# Patient Record
Sex: FEMALE | Race: WHITE | NOT HISPANIC OR LATINO | Employment: OTHER | URBAN - METROPOLITAN AREA
[De-identification: names, ages, dates, MRNs, and addresses within clinical notes are randomized per-mention and may not be internally consistent; named-entity substitution may affect disease eponyms.]

---

## 2021-09-05 ENCOUNTER — APPOINTMENT (OUTPATIENT)
Dept: CT IMAGING | Facility: HOSPITAL | Age: 86
End: 2021-09-05

## 2021-09-05 ENCOUNTER — HOSPITAL ENCOUNTER (EMERGENCY)
Facility: HOSPITAL | Age: 86
Discharge: HOME | End: 2021-09-06
Attending: EMERGENCY MEDICINE | Admitting: EMERGENCY MEDICINE

## 2021-09-05 VITALS
DIASTOLIC BLOOD PRESSURE: 81 MMHG | SYSTOLIC BLOOD PRESSURE: 158 MMHG | WEIGHT: 135 LBS | TEMPERATURE: 98.5 F | HEART RATE: 85 BPM | RESPIRATION RATE: 18 BRPM | HEIGHT: 65 IN | BODY MASS INDEX: 22.49 KG/M2 | OXYGEN SATURATION: 98 %

## 2021-09-05 DIAGNOSIS — R10.31 BILATERAL LOWER ABDOMINAL CRAMPING: Primary | ICD-10-CM

## 2021-09-05 DIAGNOSIS — R79.89 ELEVATED LACTIC ACID LEVEL: ICD-10-CM

## 2021-09-05 DIAGNOSIS — R10.32 BILATERAL LOWER ABDOMINAL CRAMPING: Primary | ICD-10-CM

## 2021-09-05 DIAGNOSIS — R14.3 FLATUS: ICD-10-CM

## 2021-09-05 DIAGNOSIS — Z87.19 HISTORY OF IBS: ICD-10-CM

## 2021-09-05 DIAGNOSIS — T50.905A ADVERSE EFFECT OF DRUG, INITIAL ENCOUNTER: ICD-10-CM

## 2021-09-05 DIAGNOSIS — Z87.440 HISTORY OF UTI: ICD-10-CM

## 2021-09-05 DIAGNOSIS — R19.7 DIARRHEA, UNSPECIFIED TYPE: ICD-10-CM

## 2021-09-05 LAB
ALBUMIN SERPL-MCNC: 4.5 G/DL (ref 3.5–5.2)
ALBUMIN/GLOB SERPL: 1.4 G/DL
ALP SERPL-CCNC: 123 U/L (ref 39–117)
ALT SERPL W P-5'-P-CCNC: 9 U/L (ref 1–33)
ANION GAP SERPL CALCULATED.3IONS-SCNC: 14 MMOL/L (ref 5–15)
AST SERPL-CCNC: 19 U/L (ref 1–32)
BASOPHILS # BLD AUTO: 0.04 10*3/MM3 (ref 0–0.2)
BASOPHILS NFR BLD AUTO: 0.6 % (ref 0–1.5)
BILIRUB SERPL-MCNC: 0.2 MG/DL (ref 0–1.2)
BUN SERPL-MCNC: 12 MG/DL (ref 8–23)
BUN/CREAT SERPL: 9.9 (ref 7–25)
CALCIUM SPEC-SCNC: 9.5 MG/DL (ref 8.6–10.5)
CHLORIDE SERPL-SCNC: 101 MMOL/L (ref 98–107)
CO2 SERPL-SCNC: 24 MMOL/L (ref 22–29)
CREAT SERPL-MCNC: 1.21 MG/DL (ref 0.57–1)
CRP SERPL-MCNC: <0.3 MG/DL (ref 0–0.5)
D-LACTATE SERPL-SCNC: 3 MMOL/L (ref 0.5–2)
DEPRECATED RDW RBC AUTO: 41.6 FL (ref 37–54)
EOSINOPHIL # BLD AUTO: 0.47 10*3/MM3 (ref 0–0.4)
EOSINOPHIL NFR BLD AUTO: 7.5 % (ref 0.3–6.2)
ERYTHROCYTE [DISTWIDTH] IN BLOOD BY AUTOMATED COUNT: 12.5 % (ref 12.3–15.4)
FLUAV RNA RESP QL NAA+PROBE: NOT DETECTED
FLUBV RNA RESP QL NAA+PROBE: NOT DETECTED
GFR SERPL CREATININE-BSD FRML MDRD: 42 ML/MIN/1.73
GFR SERPL CREATININE-BSD FRML MDRD: 51 ML/MIN/1.73
GLOBULIN UR ELPH-MCNC: 3.2 GM/DL
GLUCOSE SERPL-MCNC: 128 MG/DL (ref 65–99)
HCT VFR BLD AUTO: 37.5 % (ref 34–46.6)
HGB BLD-MCNC: 12.5 G/DL (ref 12–15.9)
HOLD SPECIMEN: NORMAL
HOLD SPECIMEN: NORMAL
IMM GRANULOCYTES # BLD AUTO: 0.01 10*3/MM3 (ref 0–0.05)
IMM GRANULOCYTES NFR BLD AUTO: 0.2 % (ref 0–0.5)
LDH SERPL-CCNC: 207 U/L (ref 135–214)
LIPASE SERPL-CCNC: 58 U/L (ref 13–60)
LYMPHOCYTES # BLD AUTO: 2.44 10*3/MM3 (ref 0.7–3.1)
LYMPHOCYTES NFR BLD AUTO: 38.9 % (ref 19.6–45.3)
MCH RBC QN AUTO: 30.5 PG (ref 26.6–33)
MCHC RBC AUTO-ENTMCNC: 33.3 G/DL (ref 31.5–35.7)
MCV RBC AUTO: 91.5 FL (ref 79–97)
MONOCYTES # BLD AUTO: 0.56 10*3/MM3 (ref 0.1–0.9)
MONOCYTES NFR BLD AUTO: 8.9 % (ref 5–12)
NEUTROPHILS NFR BLD AUTO: 2.76 10*3/MM3 (ref 1.7–7)
NEUTROPHILS NFR BLD AUTO: 43.9 % (ref 42.7–76)
NRBC BLD AUTO-RTO: 0 /100 WBC (ref 0–0.2)
PLATELET # BLD AUTO: 244 10*3/MM3 (ref 140–450)
PMV BLD AUTO: 9.2 FL (ref 6–12)
POTASSIUM SERPL-SCNC: 3.9 MMOL/L (ref 3.5–5.2)
PROCALCITONIN SERPL-MCNC: 0.13 NG/ML (ref 0–0.25)
PROT SERPL-MCNC: 7.7 G/DL (ref 6–8.5)
RBC # BLD AUTO: 4.1 10*6/MM3 (ref 3.77–5.28)
SARS-COV-2 RNA RESP QL NAA+PROBE: NOT DETECTED
SODIUM SERPL-SCNC: 139 MMOL/L (ref 136–145)
WBC # BLD AUTO: 6.28 10*3/MM3 (ref 3.4–10.8)
WHOLE BLOOD HOLD SPECIMEN: NORMAL
WHOLE BLOOD HOLD SPECIMEN: NORMAL

## 2021-09-05 PROCEDURE — 93005 ELECTROCARDIOGRAM TRACING: CPT | Performed by: EMERGENCY MEDICINE

## 2021-09-05 PROCEDURE — 80053 COMPREHEN METABOLIC PANEL: CPT | Performed by: EMERGENCY MEDICINE

## 2021-09-05 PROCEDURE — 99284 EMERGENCY DEPT VISIT MOD MDM: CPT

## 2021-09-05 PROCEDURE — 99283 EMERGENCY DEPT VISIT LOW MDM: CPT

## 2021-09-05 PROCEDURE — 84145 PROCALCITONIN (PCT): CPT | Performed by: PHYSICIAN ASSISTANT

## 2021-09-05 PROCEDURE — 86140 C-REACTIVE PROTEIN: CPT | Performed by: PHYSICIAN ASSISTANT

## 2021-09-05 PROCEDURE — 87636 SARSCOV2 & INF A&B AMP PRB: CPT | Performed by: PHYSICIAN ASSISTANT

## 2021-09-05 PROCEDURE — 83690 ASSAY OF LIPASE: CPT | Performed by: EMERGENCY MEDICINE

## 2021-09-05 PROCEDURE — 85025 COMPLETE CBC W/AUTO DIFF WBC: CPT | Performed by: EMERGENCY MEDICINE

## 2021-09-05 PROCEDURE — 83605 ASSAY OF LACTIC ACID: CPT | Performed by: EMERGENCY MEDICINE

## 2021-09-05 PROCEDURE — 83615 LACTATE (LD) (LDH) ENZYME: CPT | Performed by: PHYSICIAN ASSISTANT

## 2021-09-05 PROCEDURE — 74176 CT ABD & PELVIS W/O CONTRAST: CPT

## 2021-09-05 RX ORDER — DICYCLOMINE HCL 20 MG
20 TABLET ORAL EVERY 6 HOURS
COMMUNITY
End: 2021-09-06 | Stop reason: SDUPTHER

## 2021-09-05 RX ORDER — SODIUM CHLORIDE 9 MG/ML
10 INJECTION INTRAVENOUS AS NEEDED
Status: DISCONTINUED | OUTPATIENT
Start: 2021-09-05 | End: 2021-09-06 | Stop reason: HOSPADM

## 2021-09-05 RX ORDER — TAMARIND SEED/TURMERIC EXTRACT 250 MG
TABLET ORAL
COMMUNITY

## 2021-09-05 RX ORDER — OMEPRAZOLE 40 MG/1
40 CAPSULE, DELAYED RELEASE ORAL DAILY
COMMUNITY

## 2021-09-05 RX ADMIN — SODIUM CHLORIDE 1000 ML: 9 INJECTION, SOLUTION INTRAVENOUS at 22:54

## 2021-09-06 LAB
BACTERIA UR QL AUTO: ABNORMAL /HPF
BILIRUB UR QL STRIP: NEGATIVE
CLARITY UR: CLEAR
COLOR UR: YELLOW
D-LACTATE SERPL-SCNC: 2.2 MMOL/L (ref 0.5–2)
GLUCOSE UR STRIP-MCNC: NEGATIVE MG/DL
HGB UR QL STRIP.AUTO: NEGATIVE
HOLD SPECIMEN: NORMAL
HYALINE CASTS UR QL AUTO: ABNORMAL /LPF
KETONES UR QL STRIP: NEGATIVE
LEUKOCYTE ESTERASE UR QL STRIP.AUTO: ABNORMAL
NITRITE UR QL STRIP: NEGATIVE
PH UR STRIP.AUTO: 7.5 [PH] (ref 5–8)
PROT UR QL STRIP: NEGATIVE
RBC # UR: ABNORMAL /HPF
REF LAB TEST METHOD: ABNORMAL
SP GR UR STRIP: 1.01 (ref 1–1.03)
SQUAMOUS #/AREA URNS HPF: ABNORMAL /HPF
UROBILINOGEN UR QL STRIP: ABNORMAL
WBC UR QL AUTO: ABNORMAL /HPF

## 2021-09-06 PROCEDURE — 83605 ASSAY OF LACTIC ACID: CPT | Performed by: EMERGENCY MEDICINE

## 2021-09-06 PROCEDURE — 81001 URINALYSIS AUTO W/SCOPE: CPT | Performed by: EMERGENCY MEDICINE

## 2021-09-06 RX ORDER — DICYCLOMINE HCL 20 MG
20 TABLET ORAL EVERY 6 HOURS PRN
Qty: 20 TABLET | Refills: 0 | Status: SHIPPED | OUTPATIENT
Start: 2021-09-06

## 2021-09-06 NOTE — DISCHARGE INSTRUCTIONS
ER evaluation reveals essentially normal CBC and chemistries.  Lactic acid was elevated at 3.0 but improved with IV fluids to 2.2.  CT of the abdomen/pelvis without contrast revealed no acute infectious or inflammatory process.  Urinalysis showed no evidence of urinary tract infection.  Suspect that abdominal cramping, gas, and diarrhea are related to recent use of Bactrim DS.  Patient needs to discontinue antibiotic use at this time.  Increase water intake and encourage fluids.  Avoid greasy, spicy, or fatty foods.  Patient may utilize Activia yogurt and Kefir, probiotic, found in the dairy section of the grocery as a probiotic due to recent antibiotic use.  Patient may also take over-the-counter Gas-X to help with increased gas and bloating.  I gave her a refill on her Bentyl 20 mg by mouth every 6 hours as needed for cramping.  Recommend close PCP follow-up for recheck or return to the ER if worsening symptoms.

## 2021-09-06 NOTE — ED PROVIDER NOTES
Subjective   This is an 85-year-old female who presents to the ER with lower abdominal pain that started approximately 2 hours ago.  Patient describes pain as cramping with increased pressure.  She tried Mylanta without much improvement.  She is having increased gas.  She reported in the triage note that she was having some loose stools, but she told me that she had 2 soft normal bowel movements earlier this morning.  She denied any hematochezia, melena, or mucus in the stools.  She denies any nausea/vomiting.  She denies any sick contacts or suspicious food intake.  Patient says that she was diagnosed with a urinary tract infection 1 week ago and was prescribed Bactrim.  She denies any continued symptoms of dysuria, urgency, or frequency.  She has 5 more days of antibiotics left.  She denies any flank or back pain.  Last colonoscopy was approximately 1 year ago.  Patient has history of diverticulosis and has had diverticulitis before.  She denies any dysuria, urgency, or frequency.  She denies any fever but does have chills.  She denies any cough, congestion, loss of taste or smell, or chest pain/shortness of breath.  Patient reports having chicken tenders and fries this evening for supper.      History provided by:  Patient  Abdominal Pain  Pain location:  LLQ and RLQ  Pain quality: cramping and pressure    Pain radiates to:  Does not radiate  Onset quality:  Sudden  Duration:  2 hours  Timing:  Constant  Chronicity:  New  Context: previous surgery (Hysterectomy)    Context: not sick contacts and not suspicious food intake    Context comment:  Patient reports bilateral lower quadrant pain described as cramping and pressure that started 2 hours ago.  She reports 2 soft bowel movements earlier today.  Diagnosed with recent UTI approximately 1 week ago and has been taking Bactrim.  Positive chills.  Relieved by:  Nothing  Worsened by:  Nothing  Ineffective treatments:  OTC medications (Recent Rx for Bactrim for UTI  and Mylanta for abdominal cramping this evening.)  Associated symptoms: chills, diarrhea (To soft stools earlier today.  No hematochezia or melena.) and flatus    Associated symptoms: no anorexia, no belching, no chest pain, no constipation, no cough, no dysuria, no fatigue, no fever, no hematochezia, no melena, no nausea and no vomiting    Risk factors: no alcohol abuse, has not had multiple surgeries and no NSAID use        Review of Systems   Constitutional: Positive for chills. Negative for activity change, appetite change, fatigue and fever.   HENT: Negative.    Respiratory: Negative.  Negative for cough and chest tightness.    Cardiovascular: Negative.  Negative for chest pain, palpitations and leg swelling.   Gastrointestinal: Positive for abdominal pain (Bilateral lower quadrant abdominal pain), diarrhea (To soft stools earlier today.  No hematochezia or melena.) and flatus. Negative for anorexia, blood in stool, constipation, hematochezia, melena, nausea and vomiting.        Increased flatus and gas.   Genitourinary: Negative for dysuria, flank pain, frequency and urgency.        Recent UTI 1 week ago.  Patient prescribed Bactrim DS.   Musculoskeletal: Negative.  Negative for back pain.   Neurological: Positive for weakness (Generally weak). Negative for dizziness, light-headedness and headaches.   All other systems reviewed and are negative.      Past Medical History:   Diagnosis Date   • Irritable bowel syndrome    • UTI (urinary tract infection)        No Known Allergies    Past Surgical History:   Procedure Laterality Date   • HYSTERECTOMY         History reviewed. No pertinent family history.    Social History     Socioeconomic History   • Marital status:      Spouse name: Not on file   • Number of children: Not on file   • Years of education: Not on file   • Highest education level: Not on file   Tobacco Use   • Smoking status: Never Smoker   • Smokeless tobacco: Never Used   Vaping Use   •  Vaping Use: Never used   Substance and Sexual Activity   • Alcohol use: Never   • Drug use: Never   • Sexual activity: Defer           Objective   Physical Exam  Vitals and nursing note reviewed.   Constitutional:       Appearance: Normal appearance.      Comments: Patient is chilling with shaking of the upper and lower extremities.  She is afebrile.   HENT:      Head: Normocephalic and atraumatic.      Right Ear: Tympanic membrane normal.      Left Ear: Tympanic membrane normal.      Nose: Nose normal.      Mouth/Throat:      Mouth: Mucous membranes are moist.      Pharynx: Oropharynx is clear.   Eyes:      Extraocular Movements: Extraocular movements intact.      Conjunctiva/sclera: Conjunctivae normal.      Pupils: Pupils are equal, round, and reactive to light.   Cardiovascular:      Rate and Rhythm: Normal rate and regular rhythm.  No extrasystoles are present.     Pulses: Normal pulses.      Heart sounds: Normal heart sounds.      Comments: Regular rate and rhythm.  No ectopy.  No pedal edema to lower extremities.  Pulmonary:      Effort: Pulmonary effort is normal.      Breath sounds: Normal breath sounds.      Comments: Lungs are clear to auscultation bilaterally.  Abdominal:      General: Bowel sounds are normal. There is no distension.      Palpations: Abdomen is soft.      Tenderness: There is abdominal tenderness in the right lower quadrant and left lower quadrant. There is no right CVA tenderness, left CVA tenderness, guarding or rebound.      Comments: Abdomen soft without distention.  Active bowel sounds in all 4 quadrants.  Moderate tenderness to right lower quadrant and left lower quadrant.  No flank or CVA tenderness.  Abdominal exam is nonsurgical.   Musculoskeletal:         General: Normal range of motion.      Cervical back: Normal range of motion and neck supple.      Right lower leg: No edema.      Left lower leg: No edema.   Skin:     General: Skin is warm and dry.   Neurological:       General: No focal deficit present.      Mental Status: She is alert.      Cranial Nerves: Cranial nerves are intact.      Sensory: Sensation is intact.      Motor: Motor function is intact.      Coordination: Coordination is intact.      Comments: Generalized weakness with chills.  No focal deficits.         Procedures           ED Course  ED Course as of Sep 06 0314   Sun Sep 05, 2021   7429 CBC reveals normal white blood cell count at 6.28 with normal differential.  Chemistries show creatinine 1.21.  And otherwise were essentially normal.  Lactic acid is 3.0.  Lipase is 58.  LDH is 207.  Awaiting urinalysis results.  Covid testing is in process.  CT of the abdomen/pelvis without contrast reveals no acute intra-abdominal or intrapelvic pathology.  Patient does have evidence of diverticulosis to the sigmoid colon but no evidence of diverticulitis.  Patient receiving fluid bolus at present.  We will continue to follow closely.    [FC]   Mon Sep 06, 2021   0140 Upon reevaluation, patient is feeling much better.  She still has increased flatus but says that pain is subsiding after IV fluid bolus.  We will repeat lactic acid.  If it is trending downward, we will more than likely discharge patient to home with Zofran and Bentyl as prescribed for cramping.  Patient is agreeable with that plan.    [FC]   0307 Upon second reevaluation, lactic acid has improved to 2.2.  Patient feeling much better.  She still continues to have some increased flatus.  I recommended that takes Gas-X once she arrives home.  Patient says that she has antigas medicine at home.  I also will send in a prescription for Bentyl 20 mg by mouth every 6 hours as needed for cramping.  I advised patient to discontinue use of Bactrim DS.  I feel that it could be contributing to some of her abdominal discomfort and urinalysis during the ER work-up is improved.  Patient is agreeable.  Patient also needs to avoid greasy, spicy, or fatty foods.  Recommend close  PCP follow-up for recheck on Tuesday or return to the ER if any worsening symptoms sooner.    [FC]      ED Course User Index  [FC] Marybel Sellers PA-C           Recent Results (from the past 24 hour(s))   Comprehensive Metabolic Panel    Collection Time: 09/05/21 10:11 PM    Specimen: Blood   Result Value Ref Range    Glucose 128 (H) 65 - 99 mg/dL    BUN 12 8 - 23 mg/dL    Creatinine 1.21 (H) 0.57 - 1.00 mg/dL    Sodium 139 136 - 145 mmol/L    Potassium 3.9 3.5 - 5.2 mmol/L    Chloride 101 98 - 107 mmol/L    CO2 24.0 22.0 - 29.0 mmol/L    Calcium 9.5 8.6 - 10.5 mg/dL    Total Protein 7.7 6.0 - 8.5 g/dL    Albumin 4.50 3.50 - 5.20 g/dL    ALT (SGPT) 9 1 - 33 U/L    AST (SGOT) 19 1 - 32 U/L    Alkaline Phosphatase 123 (H) 39 - 117 U/L    Total Bilirubin 0.2 0.0 - 1.2 mg/dL    eGFR Non African Amer 42 (L) >60 mL/min/1.73    eGFR  African Amer 51 (L) >60 mL/min/1.73    Globulin 3.2 gm/dL    A/G Ratio 1.4 g/dL    BUN/Creatinine Ratio 9.9 7.0 - 25.0    Anion Gap 14.0 5.0 - 15.0 mmol/L   Lipase    Collection Time: 09/05/21 10:11 PM    Specimen: Blood   Result Value Ref Range    Lipase 58 13 - 60 U/L   Lactic Acid, Plasma    Collection Time: 09/05/21 10:11 PM    Specimen: Blood   Result Value Ref Range    Lactate 3.0 (C) 0.5 - 2.0 mmol/L   Green Top (Gel)    Collection Time: 09/05/21 10:11 PM   Result Value Ref Range    Extra Tube Hold for add-ons.    Lavender Top    Collection Time: 09/05/21 10:11 PM   Result Value Ref Range    Extra Tube hold for add-on    Gold Top - SST    Collection Time: 09/05/21 10:11 PM   Result Value Ref Range    Extra Tube Hold for add-ons.    Gray Top    Collection Time: 09/05/21 10:11 PM   Result Value Ref Range    Extra Tube Hold for add-ons.    Light Blue Top    Collection Time: 09/05/21 10:11 PM   Result Value Ref Range    Extra Tube hold for add-on    CBC Auto Differential    Collection Time: 09/05/21 10:11 PM    Specimen: Blood   Result Value Ref Range    WBC 6.28 3.40 - 10.80 10*3/mm3     RBC 4.10 3.77 - 5.28 10*6/mm3    Hemoglobin 12.5 12.0 - 15.9 g/dL    Hematocrit 37.5 34.0 - 46.6 %    MCV 91.5 79.0 - 97.0 fL    MCH 30.5 26.6 - 33.0 pg    MCHC 33.3 31.5 - 35.7 g/dL    RDW 12.5 12.3 - 15.4 %    RDW-SD 41.6 37.0 - 54.0 fl    MPV 9.2 6.0 - 12.0 fL    Platelets 244 140 - 450 10*3/mm3    Neutrophil % 43.9 42.7 - 76.0 %    Lymphocyte % 38.9 19.6 - 45.3 %    Monocyte % 8.9 5.0 - 12.0 %    Eosinophil % 7.5 (H) 0.3 - 6.2 %    Basophil % 0.6 0.0 - 1.5 %    Immature Grans % 0.2 0.0 - 0.5 %    Neutrophils, Absolute 2.76 1.70 - 7.00 10*3/mm3    Lymphocytes, Absolute 2.44 0.70 - 3.10 10*3/mm3    Monocytes, Absolute 0.56 0.10 - 0.90 10*3/mm3    Eosinophils, Absolute 0.47 (H) 0.00 - 0.40 10*3/mm3    Basophils, Absolute 0.04 0.00 - 0.20 10*3/mm3    Immature Grans, Absolute 0.01 0.00 - 0.05 10*3/mm3    nRBC 0.0 0.0 - 0.2 /100 WBC   Procalcitonin    Collection Time: 09/05/21 10:11 PM    Specimen: Blood   Result Value Ref Range    Procalcitonin 0.13 0.00 - 0.25 ng/mL   C-reactive Protein    Collection Time: 09/05/21 10:11 PM    Specimen: Blood   Result Value Ref Range    C-Reactive Protein <0.30 0.00 - 0.50 mg/dL   Lactate Dehydrogenase    Collection Time: 09/05/21 10:11 PM    Specimen: Blood   Result Value Ref Range     135 - 214 U/L   COVID-19 and FLU A/B PCR - Swab, Nasopharynx    Collection Time: 09/05/21 10:54 PM    Specimen: Nasopharynx; Swab   Result Value Ref Range    COVID19 Not Detected Not Detected - Ref. Range    Influenza A PCR Not Detected Not Detected    Influenza B PCR Not Detected Not Detected   Urinalysis With Microscopic If Indicated (No Culture) - Urine, Clean Catch    Collection Time: 09/06/21 12:51 AM    Specimen: Urine, Clean Catch   Result Value Ref Range    Color, UA Yellow Yellow, Straw    Appearance, UA Clear Clear    pH, UA 7.5 5.0 - 8.0    Specific Gravity, UA 1.012 1.001 - 1.030    Glucose, UA Negative Negative    Ketones, UA Negative Negative    Bilirubin, UA Negative Negative     "Blood, UA Negative Negative    Protein, UA Negative Negative    Leuk Esterase, UA Small (1+) (A) Negative    Nitrite, UA Negative Negative    Urobilinogen, UA 0.2 E.U./dL 0.2 - 1.0 E.U./dL   Urinalysis, Microscopic Only - Urine, Clean Catch    Collection Time: 09/06/21 12:51 AM    Specimen: Urine, Clean Catch   Result Value Ref Range    RBC, UA None Seen None Seen, 0-2 /HPF    WBC, UA 3-5 (A) None Seen, 0-2 /HPF    Bacteria, UA None Seen None Seen, Trace /HPF    Squamous Epithelial Cells, UA 0-2 None Seen, 0-2 /HPF    Hyaline Casts, UA 0-6 0 - 6 /LPF    Methodology Automated Microscopy    STAT Lactic Acid, Reflex    Collection Time: 09/06/21  1:43 AM    Specimen: Blood   Result Value Ref Range    Lactate 2.2 (C) 0.5 - 2.0 mmol/L     Note: In addition to lab results from this visit, the labs listed above may include labs taken at another facility or during a different encounter within the last 24 hours. Please correlate lab times with ED admission and discharge times for further clarification of the services performed during this visit.    CT Abdomen Pelvis Without Contrast   Final Result   No definite acute intra-abdominal abnormality. There is diverticulosis without definite evidence of diverticulitis.               Signer Name: Dionna Otoole MD    Signed: 9/5/2021 10:42 PM    Workstation Name: [id number]     [facility]        Vitals:    09/05/21 2203 09/05/21 2205 09/05/21 2239   BP: 161/76  158/81   BP Location: Right arm     Patient Position: Sitting     Pulse: 85     Resp: 18     Temp:  98.5 °F (36.9 °C)    TempSrc:  Oral    SpO2: 98%     Weight: 61.2 kg (135 lb)     Height: 165.1 cm (65\")       Medications   Sodium Chloride (PF) 0.9 % 10 mL (has no administration in time range)   sodium chloride 0.9 % bolus 1,000 mL (0 mL Intravenous Stopped 9/6/21 0052)     ECG/EMG Results (last 24 hours)     ** No results found for the last 24 hours. **        ECG 12 Lead    (Results Pending)          "                              MDM    Final diagnoses:   Bilateral lower abdominal cramping   Flatus   Diarrhea, unspecified type   History of UTI   Elevated lactic acid level   Adverse effect of drug, initial encounter   History of IBS       ED Disposition  ED Disposition     ED Disposition Condition Comment    Discharge Stable           Davie Chase MD  [address] [id number] [phone]    Schedule an appointment as soon as possible for a visit in 2 days  Close PCP follow-up for recheck    [facility] Emergency Department  [address] [facility] [address] [phone]    If symptoms worsen         Medication List      Changed    dicyclomine 20 MG tablet  Commonly known as: BENTYL  Take 1 tablet by mouth Every 6 (Six) Hours As Needed (abdominal cramping).  What changed:   · when to take this  · reasons to take this           Where to Get Your Medications      These medications were sent to [address] - [address] [facility] - [phone]  - [phone]   [address] [id number]    Phone: [phone]   · dicyclomine 20 MG tablet          Marybel Sellers PA-C  09/06/21 0314

## 2024-04-28 ENCOUNTER — APPOINTMENT (OUTPATIENT)
Dept: GENERAL RADIOLOGY | Facility: HOSPITAL | Age: 89
End: 2024-04-28
Payer: MEDICARE

## 2024-04-28 ENCOUNTER — HOSPITAL ENCOUNTER (EMERGENCY)
Facility: HOSPITAL | Age: 89
Discharge: HOME | End: 2024-04-28
Attending: EMERGENCY MEDICINE | Admitting: EMERGENCY MEDICINE
Payer: MEDICARE

## 2024-04-28 VITALS
OXYGEN SATURATION: 99 % | DIASTOLIC BLOOD PRESSURE: 65 MMHG | SYSTOLIC BLOOD PRESSURE: 150 MMHG | RESPIRATION RATE: 20 BRPM | HEART RATE: 62 BPM | WEIGHT: 140 LBS | HEIGHT: 63 IN | BODY MASS INDEX: 24.8 KG/M2 | TEMPERATURE: 97.9 F

## 2024-04-28 DIAGNOSIS — R06.02 SHORTNESS OF BREATH: Primary | ICD-10-CM

## 2024-04-28 LAB
ALBUMIN SERPL-MCNC: 3.6 G/DL (ref 3.5–5.2)
ALBUMIN/GLOB SERPL: 1 G/DL
ALP SERPL-CCNC: 101 U/L (ref 39–117)
ALT SERPL W P-5'-P-CCNC: 7 U/L (ref 1–33)
ANION GAP SERPL CALCULATED.3IONS-SCNC: 13 MMOL/L (ref 5–15)
AST SERPL-CCNC: 15 U/L (ref 1–32)
BASOPHILS # BLD AUTO: 0.04 10*3/MM3 (ref 0–0.2)
BASOPHILS NFR BLD AUTO: 0.5 % (ref 0–1.5)
BILIRUB SERPL-MCNC: 0.3 MG/DL (ref 0–1.2)
BUN SERPL-MCNC: 14 MG/DL (ref 8–23)
BUN/CREAT SERPL: 16.3 (ref 7–25)
CALCIUM SPEC-SCNC: 8.9 MG/DL (ref 8.6–10.5)
CHLORIDE SERPL-SCNC: 97 MMOL/L (ref 98–107)
CO2 SERPL-SCNC: 21 MMOL/L (ref 22–29)
CREAT SERPL-MCNC: 0.86 MG/DL (ref 0.57–1)
D DIMER PPP FEU-MCNC: 0.75 MCGFEU/ML (ref 0–0.87)
DEPRECATED RDW RBC AUTO: 43.4 FL (ref 37–54)
EGFRCR SERPLBLD CKD-EPI 2021: 65.5 ML/MIN/1.73
EOSINOPHIL # BLD AUTO: 0.43 10*3/MM3 (ref 0–0.4)
EOSINOPHIL NFR BLD AUTO: 5.2 % (ref 0.3–6.2)
ERYTHROCYTE [DISTWIDTH] IN BLOOD BY AUTOMATED COUNT: 13.6 % (ref 12.3–15.4)
GLOBULIN UR ELPH-MCNC: 3.6 GM/DL
GLUCOSE SERPL-MCNC: 93 MG/DL (ref 65–99)
HCT VFR BLD AUTO: 37.5 % (ref 34–46.6)
HGB BLD-MCNC: 12 G/DL (ref 12–15.9)
HOLD SPECIMEN: NORMAL
IMM GRANULOCYTES # BLD AUTO: 0.03 10*3/MM3 (ref 0–0.05)
IMM GRANULOCYTES NFR BLD AUTO: 0.4 % (ref 0–0.5)
LYMPHOCYTES # BLD AUTO: 1.63 10*3/MM3 (ref 0.7–3.1)
LYMPHOCYTES NFR BLD AUTO: 19.8 % (ref 19.6–45.3)
MCH RBC QN AUTO: 28.3 PG (ref 26.6–33)
MCHC RBC AUTO-ENTMCNC: 32 G/DL (ref 31.5–35.7)
MCV RBC AUTO: 88.4 FL (ref 79–97)
MONOCYTES # BLD AUTO: 0.67 10*3/MM3 (ref 0.1–0.9)
MONOCYTES NFR BLD AUTO: 8.1 % (ref 5–12)
NEUTROPHILS NFR BLD AUTO: 5.45 10*3/MM3 (ref 1.7–7)
NEUTROPHILS NFR BLD AUTO: 66 % (ref 42.7–76)
NRBC BLD AUTO-RTO: 0 /100 WBC (ref 0–0.2)
NT-PROBNP SERPL-MCNC: <36 PG/ML (ref 0–1800)
PLATELET # BLD AUTO: 258 10*3/MM3 (ref 140–450)
PMV BLD AUTO: 8.9 FL (ref 6–12)
POTASSIUM SERPL-SCNC: 4.1 MMOL/L (ref 3.5–5.2)
PROT SERPL-MCNC: 7.2 G/DL (ref 6–8.5)
QT INTERVAL: 394 MS
QTC INTERVAL: 425 MS
RBC # BLD AUTO: 4.24 10*6/MM3 (ref 3.77–5.28)
SODIUM SERPL-SCNC: 131 MMOL/L (ref 136–145)
TROPONIN T SERPL HS-MCNC: 11 NG/L
TROPONIN T SERPL HS-MCNC: 9 NG/L
WBC NRBC COR # BLD AUTO: 8.25 10*3/MM3 (ref 3.4–10.8)
WHOLE BLOOD HOLD COAG: NORMAL
WHOLE BLOOD HOLD SPECIMEN: NORMAL

## 2024-04-28 PROCEDURE — 85025 COMPLETE CBC W/AUTO DIFF WBC: CPT | Performed by: EMERGENCY MEDICINE

## 2024-04-28 PROCEDURE — 84484 ASSAY OF TROPONIN QUANT: CPT | Performed by: EMERGENCY MEDICINE

## 2024-04-28 PROCEDURE — 84484 ASSAY OF TROPONIN QUANT: CPT | Performed by: PHYSICIAN ASSISTANT

## 2024-04-28 PROCEDURE — 36415 COLL VENOUS BLD VENIPUNCTURE: CPT

## 2024-04-28 PROCEDURE — 99284 EMERGENCY DEPT VISIT MOD MDM: CPT

## 2024-04-28 PROCEDURE — 80053 COMPREHEN METABOLIC PANEL: CPT | Performed by: EMERGENCY MEDICINE

## 2024-04-28 PROCEDURE — 93005 ELECTROCARDIOGRAM TRACING: CPT | Performed by: EMERGENCY MEDICINE

## 2024-04-28 PROCEDURE — 85379 FIBRIN DEGRADATION QUANT: CPT | Performed by: PHYSICIAN ASSISTANT

## 2024-04-28 PROCEDURE — 83880 ASSAY OF NATRIURETIC PEPTIDE: CPT | Performed by: EMERGENCY MEDICINE

## 2024-04-28 PROCEDURE — 71045 X-RAY EXAM CHEST 1 VIEW: CPT

## 2024-04-28 RX ORDER — SODIUM CHLORIDE 0.9 % (FLUSH) 0.9 %
10 SYRINGE (ML) INJECTION AS NEEDED
Status: DISCONTINUED | OUTPATIENT
Start: 2024-04-28 | End: 2024-04-28 | Stop reason: HOSPADM

## 2024-04-28 NOTE — ED PROVIDER NOTES
Subjective   History of Present Illness  88-year-old female presents emergency department today with shortness of breath.  Patient reports she went to bed she did not feel bad woke up in the night feeling short of breath.  Nothing really seems to exacerbate her shortness of breath.  She had no recent illness.  She reports she has had no cough congestion runny nose or fevers.  She has no prior history of chronic lung disease including COPD or emphysema.  Does not smoke and never has smoked.  She has not had a history of CHF that they are aware of.  She has had no fevers no chills.  Denies nausea or vomiting.  Has had a B12 deficiency anemia but not iron deficiency as far as the family knows.  No chest pain associated or diaphoresis.    History provided by:  Patient   used: No    Shortness of Breath  Severity:  Moderate  Onset quality:  Sudden  Duration:  4 hours  Timing:  Constant  Progression:  Unchanged  Chronicity:  New      Review of Systems   Respiratory:  Positive for shortness of breath.        Past Medical History:   Diagnosis Date    Irritable bowel syndrome     UTI (urinary tract infection)        Allergies   Allergen Reactions    Penicillins Shortness Of Breath    Ciprofloxacin Nausea And Vomiting    Guaifenesin Er Unknown - Low Severity    Thorazine [Chlorpromazine] Anxiety and Palpitations       Past Surgical History:   Procedure Laterality Date    HYSTERECTOMY         No family history on file.    Social History     Socioeconomic History    Marital status:    Tobacco Use    Smoking status: Never    Smokeless tobacco: Never   Vaping Use    Vaping status: Never Used   Substance and Sexual Activity    Alcohol use: Never    Drug use: Never    Sexual activity: Defer           Objective   Physical Exam  Vitals and nursing note reviewed.   Constitutional:       General: She is not in acute distress.     Appearance: She is well-developed. She is not diaphoretic.   HENT:      Head:  Normocephalic and atraumatic.      Nose: Nose normal.   Eyes:      General: No scleral icterus.     Conjunctiva/sclera: Conjunctivae normal.   Cardiovascular:      Rate and Rhythm: Normal rate and regular rhythm.      Heart sounds: Normal heart sounds. No murmur heard.  Pulmonary:      Effort: Pulmonary effort is normal. No respiratory distress.      Breath sounds: Normal breath sounds. No decreased breath sounds, wheezing or rhonchi.   Abdominal:      General: Bowel sounds are normal.      Palpations: Abdomen is soft.      Tenderness: There is no abdominal tenderness.   Musculoskeletal:         General: Normal range of motion.      Cervical back: Normal range of motion and neck supple.   Skin:     General: Skin is warm and dry.   Neurological:      Mental Status: She is alert and oriented to person, place, and time.   Psychiatric:         Behavior: Behavior normal.         Procedures           ED Course  ED Course as of 04/29/24 0719   Sun Apr 28, 2024   0859 XR Chest 1 View  Personally reviewed single view the chest.  My interpretation is no focal lobar infiltrate visualized. [RS]   0859 SpO2: 99 %  Initial vital signs reviewed and other than mildly elevated blood pressure, remainder of vital signs are within normal limits.  Good oxygen saturations on room air. [RS]   1121 Laboratory data patient's white count 8.25 with an H&H of 12 and 37.  Platelet count of 258.  CMP had a glucose of 93 with a BUN of 14 and a creatinine 1.6.  Sodium 131 with a potassium of 4.1.  LFTs were unremarkable with ALT of 7 AST of 15 alk phos of 101 and total bili 0.3.  2 troponins with a delta of only 2.  D-dimer was 0.75 which is normal for age adjustment.  Chest x-ray was unremarkable.  O2 sats remained 98%.  She is not in any respiratory distress. [CHAVA]   1123 She had no respiratory symptoms. [CHAVA]      ED Course User Index  [CHAVA] Mayco Masters PA  [RS] Fabrizio Arroyo MD                                   Recent Results  (from the past 24 hour(s))   ECG 12 Lead ED Triage Standing Order; SOA    Collection Time: 04/28/24  7:45 AM   Result Value Ref Range    QT Interval 394 ms    QTC Interval 425 ms   Comprehensive Metabolic Panel    Collection Time: 04/28/24  8:14 AM    Specimen: Blood   Result Value Ref Range    Glucose 93 65 - 99 mg/dL    BUN 14 8 - 23 mg/dL    Creatinine 0.86 0.57 - 1.00 mg/dL    Sodium 131 (L) 136 - 145 mmol/L    Potassium 4.1 3.5 - 5.2 mmol/L    Chloride 97 (L) 98 - 107 mmol/L    CO2 21.0 (L) 22.0 - 29.0 mmol/L    Calcium 8.9 8.6 - 10.5 mg/dL    Total Protein 7.2 6.0 - 8.5 g/dL    Albumin 3.6 3.5 - 5.2 g/dL    ALT (SGPT) 7 1 - 33 U/L    AST (SGOT) 15 1 - 32 U/L    Alkaline Phosphatase 101 39 - 117 U/L    Total Bilirubin 0.3 0.0 - 1.2 mg/dL    Globulin 3.6 gm/dL    A/G Ratio 1.0 g/dL    BUN/Creatinine Ratio 16.3 7.0 - 25.0    Anion Gap 13.0 5.0 - 15.0 mmol/L    eGFR 65.5 >60.0 mL/min/1.73   BNP    Collection Time: 04/28/24  8:14 AM    Specimen: Blood   Result Value Ref Range    proBNP <36.0 0.0 - 1,800.0 pg/mL   Single High Sensitivity Troponin T    Collection Time: 04/28/24  8:14 AM    Specimen: Blood   Result Value Ref Range    HS Troponin T 9 <14 ng/L   Green Top (Gel)    Collection Time: 04/28/24  8:14 AM   Result Value Ref Range    Extra Tube Hold for add-ons.    Lavender Top    Collection Time: 04/28/24  8:15 AM   Result Value Ref Range    Extra Tube hold for add-on    CBC Auto Differential    Collection Time: 04/28/24  8:15 AM    Specimen: Blood   Result Value Ref Range    WBC 8.25 3.40 - 10.80 10*3/mm3    RBC 4.24 3.77 - 5.28 10*6/mm3    Hemoglobin 12.0 12.0 - 15.9 g/dL    Hematocrit 37.5 34.0 - 46.6 %    MCV 88.4 79.0 - 97.0 fL    MCH 28.3 26.6 - 33.0 pg    MCHC 32.0 31.5 - 35.7 g/dL    RDW 13.6 12.3 - 15.4 %    RDW-SD 43.4 37.0 - 54.0 fl    MPV 8.9 6.0 - 12.0 fL    Platelets 258 140 - 450 10*3/mm3    Neutrophil % 66.0 42.7 - 76.0 %    Lymphocyte % 19.8 19.6 - 45.3 %    Monocyte % 8.1 5.0 - 12.0 %     Eosinophil % 5.2 0.3 - 6.2 %    Basophil % 0.5 0.0 - 1.5 %    Immature Grans % 0.4 0.0 - 0.5 %    Neutrophils, Absolute 5.45 1.70 - 7.00 10*3/mm3    Lymphocytes, Absolute 1.63 0.70 - 3.10 10*3/mm3    Monocytes, Absolute 0.67 0.10 - 0.90 10*3/mm3    Eosinophils, Absolute 0.43 (H) 0.00 - 0.40 10*3/mm3    Basophils, Absolute 0.04 0.00 - 0.20 10*3/mm3    Immature Grans, Absolute 0.03 0.00 - 0.05 10*3/mm3    nRBC 0.0 0.0 - 0.2 /100 WBC   Light Blue Top    Collection Time: 04/28/24 10:27 AM   Result Value Ref Range    Extra Tube Hold for add-ons.    Single High Sensitivity Troponin T    Collection Time: 04/28/24 10:27 AM    Specimen: Blood   Result Value Ref Range    HS Troponin T 11 <14 ng/L   D-dimer, Quantitative    Collection Time: 04/28/24 10:27 AM    Specimen: Blood   Result Value Ref Range    D-Dimer, Quantitative 0.75 0.00 - 0.87 MCGFEU/mL     Note: In addition to lab results from this visit, the labs listed above may include labs taken at another facility or during a different encounter within the last 24 hours. Please correlate lab times with ED admission and discharge times for further clarification of the services performed during this visit.    XR Chest 1 View   Final Result   Impression:   No active disease         Electronically Signed: Blaise Ornelas MD     4/28/2024 7:53 AM EDT     Workstation ID: [id number]        Vitals:    04/28/24 1005 04/28/24 1007 04/28/24 1102 04/28/24 1130   BP:   143/64 150/65   BP Location:       Patient Position:       Pulse: 64 63 61 62   Resp:       Temp:       TempSrc:       SpO2: 98% 97% 99% 99%   Weight:       Height:         Medications - No data to display  ECG/EMG Results (last 24 hours)       Procedure Component Value Units Date/Time    ECG 12 Lead ED Triage Standing Order; SOA [[id number]] Collected: 04/28/24 0745     Updated: 04/28/24 1008     QT Interval 394 ms      QTC Interval 425 ms     Narrative:      Test Reason : ED Triage Standing Order~  Blood Pressure :   */*    mmHG  Vent. Rate :  70 BPM     Atrial Rate :  70 BPM     P-R Int : 200 ms          QRS Dur :  86 ms      QT Int : 394 ms       P-R-T Axes :  66  24  46 degrees     QTc Int : 425 ms    Normal sinus rhythm  Normal ECG  When compared with ECG of 05-SEP-2021 22:09,  No significant change was found  Confirmed by MALIKA DARLING MD ([id number]) on 4/28/2024 10:08:03 AM    Referred By: EDMD           Confirmed By: MALIKA DARLING MD          ECG 12 Lead ED Triage Standing Order; SOA   Final Result   Test Reason : ED Triage Standing Order~   Blood Pressure :   */*   mmHG   Vent. Rate :  70 BPM     Atrial Rate :  70 BPM      P-R Int : 200 ms          QRS Dur :  86 ms       QT Int : 394 ms       P-R-T Axes :  66  24  46 degrees      QTc Int : 425 ms      Normal sinus rhythm   Normal ECG   When compared with ECG of 05-SEP-2021 22:09,   No significant change was found   Confirmed by MALIKA DARLING MD ([id number]) on 4/28/2024 10:08:03 AM      Referred By: EDMD           Confirmed By: MALIKA DARLING MD                    Medical Decision Making  Problems Addressed:  Shortness of breath: complicated acute illness or injury    Amount and/or Complexity of Data Reviewed  Labs: ordered. Decision-making details documented in ED Course.  Radiology: ordered. Decision-making details documented in ED Course.  ECG/medicine tests: ordered.    Risk  Prescription drug management.        Final diagnoses:   Shortness of breath       ED Disposition  ED Disposition       ED Disposition   Discharge    Condition   Stable    Comment   --               Davie Chase MD  [address] [place] [address] [id number] [phone]               Medication List      No changes were made to your prescriptions during this visit.            Mayco Masters PA  04/29/24 0719